# Patient Record
Sex: MALE | Race: WHITE | NOT HISPANIC OR LATINO | URBAN - METROPOLITAN AREA
[De-identification: names, ages, dates, MRNs, and addresses within clinical notes are randomized per-mention and may not be internally consistent; named-entity substitution may affect disease eponyms.]

---

## 2018-04-01 ENCOUNTER — EMERGENCY (EMERGENCY)
Facility: HOSPITAL | Age: 43
LOS: 1 days | Discharge: HOME | End: 2018-04-01
Attending: EMERGENCY MEDICINE | Admitting: EMERGENCY MEDICINE
Payer: COMMERCIAL

## 2018-04-01 VITALS
HEART RATE: 72 BPM | TEMPERATURE: 98 F | DIASTOLIC BLOOD PRESSURE: 82 MMHG | RESPIRATION RATE: 16 BRPM | OXYGEN SATURATION: 100 % | SYSTOLIC BLOOD PRESSURE: 125 MMHG

## 2018-04-01 VITALS
DIASTOLIC BLOOD PRESSURE: 66 MMHG | HEART RATE: 80 BPM | OXYGEN SATURATION: 100 % | TEMPERATURE: 98 F | RESPIRATION RATE: 18 BRPM | SYSTOLIC BLOOD PRESSURE: 105 MMHG

## 2018-04-01 PROCEDURE — 93010 ELECTROCARDIOGRAM REPORT: CPT

## 2018-04-01 PROCEDURE — 93005 ELECTROCARDIOGRAM TRACING: CPT | Mod: 76

## 2018-04-01 PROCEDURE — 99284 EMERGENCY DEPT VISIT MOD MDM: CPT | Mod: 25

## 2018-04-01 PROCEDURE — 99283 EMERGENCY DEPT VISIT LOW MDM: CPT

## 2018-04-01 RX ORDER — ONDANSETRON 8 MG/1
8 TABLET, FILM COATED ORAL ONCE
Qty: 0 | Refills: 0 | Status: COMPLETED | OUTPATIENT
Start: 2018-04-01 | End: 2018-04-01

## 2018-04-01 RX ORDER — ONDANSETRON 8 MG/1
1 TABLET, FILM COATED ORAL
Qty: 10 | Refills: 0 | OUTPATIENT
Start: 2018-04-01

## 2018-04-01 RX ORDER — MECLIZINE HCL 12.5 MG
25 TABLET ORAL ONCE
Qty: 0 | Refills: 0 | Status: COMPLETED | OUTPATIENT
Start: 2018-04-01 | End: 2018-04-01

## 2018-04-01 RX ORDER — MECLIZINE HCL 12.5 MG
1 TABLET ORAL
Qty: 12 | Refills: 0 | OUTPATIENT
Start: 2018-04-01 | End: 2018-04-03

## 2018-04-01 RX ADMIN — ONDANSETRON 8 MILLIGRAM(S): 8 TABLET, FILM COATED ORAL at 05:14

## 2018-04-01 RX ADMIN — Medication 25 MILLIGRAM(S): at 05:14

## 2018-04-01 NOTE — ED PROVIDER NOTE - OBJECTIVE STATEMENT
Patient is 42 y M with no significant PMH presenting with sudden sensation of motion worse when moving head, better with rest, no pain, had n/v several times due to dizziness. Has not had this before.      ROS: Denies fever, palpitations, chills, recent sickness, HA, vision changes, cough, SOB, chest pain, abdominal pain, dysuria, hematuria, rash, new joint aches, sick contacts, and recent travel.

## 2018-04-01 NOTE — ED PROVIDER NOTE - PROGRESS NOTE DETAILS
Patient now ambulatory and has no dizziness, is tolerating PO with minimal nausea. ok to dc home with rx zofran and meclizine. -SM Patient left ED without signing paperwork but plan and prescriptions for zofran and meclizine along with return precautions were explained to the patient before arrival. -SM

## 2018-04-01 NOTE — ED PROVIDER NOTE - ATTENDING CONTRIBUTION TO CARE
Call your healthcare provider right away if you get any of the following signs or symptoms of bleeding problems:   * Pain, color, or temperature changes to any part of your body   * Headaches, dizziness or weakness   * Unusual bruising (unexplained or growing in size)   * Nosebleeds   * Bleeding gums   * Bleeding from cuts that take a long time to stop   * Menstrual bleeding or vaginal bleeding that is heavier than normal   * Pink or brown urine   * Red or black stools   * Coughing up blood   * Vomiting blood or material that looks like coffee grounds    Update Anticoagulation Clinic (C[facility]madin [facility]) with any of the following:   * Medication changes (new, stopped or dose changes, this includes over-the-counter medications and supplements)   * Planning on having any surgery, medical or dental procedures   * Diet changes   * Falls  (you should go to Emergency Department immediately for evaluation, then notify Anticoagulation Clinic)    Please, do not wait until your next appointment to update us on changes.         ------------ATTENDING NOTE------------   pt w/ wife c/o sudden onset of feeling dizzy / room spinning (vertigo) after standing to walk, felt nauseated, small amt nbnb vomiting, improved w/ closed eyes and staying still, no headache or AMS, no chest pain/discomfort or SOB/palpitations, normal neuro exam (AOX3, nml speech, CN grossly intact, VF/VA wnl, fatigable horizontal nystagmus, nml strength/sensation), no jvd/bruits, clear chest, equal distal pulses, soft benign abd -->  - Redd Leung MD   ---------------------------------------------------------------- ------------ATTENDING NOTE------------   pt w/ wife c/o sudden onset of feeling dizzy / room spinning (vertigo) after standing to walk, felt nauseated, small amt nbnb vomiting, improved w/ closed eyes and staying still, no headache or AMS, no chest pain/discomfort or SOB/palpitations, normal neuro exam (AOX3, nml speech, CN grossly intact, VF/VA wnl, fatigable horizontal nystagmus, nml strength/sensation), no jvd/bruits, clear chest, equal distal pulses, soft benign abd --> symptoms resolved, tolerating PO, nml neuro exam, nml VS, in depth dw all about ddx, tx, epstein, close outpt juliane.  - Redd Leung MD   ----------------------------------------------------------------

## 2018-04-01 NOTE — ED PROVIDER NOTE - PHYSICAL EXAMINATION
Gen: NAD, AOx3  Head: NCAT  HEENT: PERRL, oral mucosa moist, normal conjunctiva  Lung: CTAB, no respiratory distress  CV: rrr, no murmurs, Normal perfusion  Abd: soft, NTND, no CVA tenderness  MSK: No edema, no visible deformities  Neuro: No focal neurologic deficits, CN intact, motor and sensation intact, no cerebellar signs, self-extinguishing horizontal nystagmus noted  Skin: No rash   Psych: normal affect

## 2018-04-01 NOTE — ED ADULT NURSE NOTE - OBJECTIVE STATEMENT
43 YO male with no pertinent PMH via walk in presenting with complaints of nausea, vomiting, diarrhea, dizziness, weakness, and chills. Pt reports abdominal cramping, associated with diarrhea starting yesterday [id number] in the evening, which progressed to associated with dizziness, weakness, chills, nausea and vomiting this morning 4/1/2018. Pt reports he began to experience dizziness after standing which resulted in nausea and emesis. PT denies taking any medications or taking temperature at home. Pt reports recently eating out at restaurants frequently. Pt denies chest pain, shortness of breath, visual disturbances, numbness/tingling, fever, chills, diaphoresis, headache,  constipation,   or urinary symptoms.   Pt Axox4, gross neuro intact, PERRL 4 mm. Lungs clear throughout bilateral. S1S2 heard. Abdomen soft, non-tender, non-distended. Skin warm, dry, and intact. Safety and comfort measures maintained. family present at bedside.

## 2018-04-01 NOTE — ED PROVIDER NOTE - CARE PLAN
Principal Discharge DX:	Vertigo  Secondary Diagnosis:	Nausea and vomiting  Secondary Diagnosis:	Dehydration, mild

## 2022-05-24 NOTE — ED PROVIDER NOTE - NS ED MD DISPO DISCHARGE
H&P reviewed  After examining the patient I find no changes in the patients condition since the H&P had been written  Home